# Patient Record
Sex: MALE | Race: WHITE
[De-identification: names, ages, dates, MRNs, and addresses within clinical notes are randomized per-mention and may not be internally consistent; named-entity substitution may affect disease eponyms.]

---

## 2019-02-09 ENCOUNTER — HOSPITAL ENCOUNTER (EMERGENCY)
Dept: HOSPITAL 42 - ED | Age: 44
Discharge: HOME | End: 2019-02-09
Payer: COMMERCIAL

## 2019-02-09 VITALS
OXYGEN SATURATION: 99 % | SYSTOLIC BLOOD PRESSURE: 99 MMHG | HEART RATE: 81 BPM | TEMPERATURE: 98 F | DIASTOLIC BLOOD PRESSURE: 72 MMHG

## 2019-02-09 VITALS — RESPIRATION RATE: 16 BRPM

## 2019-02-09 VITALS — BODY MASS INDEX: 20.6 KG/M2

## 2019-02-09 DIAGNOSIS — F10.10: ICD-10-CM
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
## 2019-02-09 DIAGNOSIS — F17.210: ICD-10-CM

## 2019-02-09 DIAGNOSIS — S09.90XA: Primary | ICD-10-CM
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
## 2019-02-09 NOTE — ED PDOC
Physical Exam





Vital Signs











  Temp Pulse Resp BP Pulse Ox


 


 02/09/19 06:44   84  18  101/70  98


 


 02/09/19 04:14  97.6 F  66  16   100














Medical Decision Making


ED Course and Treatment: 


02/09/19 07:00


Case endorsed to me by Dr. Toney. Currently awaiting sobriety.





02/09/19 12:56


pt observed 7+ hours ambulatory steady gait ct neg. clincaly sober asking for 

dc. 





- RAD Interpretation


Radiology Orders: 











02/09/19 05:07


HEAD W/O CONTRAST [CT] Stat 














- Medication Orders


Current Medication Orders: 














Discontinued Medications





Tetanus/Reduced Diphtheria/Acell Pertussis (Boostrix Vaccine Inj)  0.5 ml IM 

.ONCE ONE


   Stop: 02/09/19 04:56











- Scribe Statement


The provider has reviewed the documentation as recorded by the Makenna Fontana





Provider Scribe Attestation:


All medical record entries made by the Scribe were at my direction and 

personally dictated by me. I have reviewed the chart and agree that the record 

accurately reflects my personal performance of the history, physical exam, 

medical decision making, and the department course for this patient. I have also

 personally directed, reviewed, and agree with the discharge instructions and 

disposition.








Disposition/Present on Arrival





- Present on Arrival


Any Indicators Present on Arrival: No


History of DVT/PE: No


History of Uncontrolled Diabetes: No


Urinary Catheter: No


History of Decub. Ulcer: No


History Surgical Site Infection Following: None





- Disposition


Have Diagnosis and Disposition been Completed?: Yes


Diagnosis: 


 Alcohol abuse, Head injury





Disposition: HOME/ ROUTINE


Disposition Time: 12:00


Patient Problems: 


                             Current Active Problems











Problem Status Onset


 


Alcohol abuse Acute 


 


Head injury Acute 











Condition: STABLE


Discharge Instructions (ExitCare):  Alcohol Use - When Is Drinking a Problem?, 

Minor Head Injury


Additional Instructions: 


return to any er with worsening. 


Referrals: 


[facility] [Outside] - Follow up with primary


[facility] [place] [Outside] - Follow up with primary


Forms:  [employer] (English)
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
